# Patient Record
(demographics unavailable — no encounter records)

---

## 2025-04-03 NOTE — HISTORY OF PRESENT ILLNESS
[FreeTextEntry1] : Ms. GRAY LAZCANO is a 79-year-old female with a PMH of CHF, GERD, DM?, COPD.  PSH: sigmoid colectomy 2/2 diverticulitis (2010), hysterectomy.   Patient presents for GERD symptoms, referred by NAPOLEON Hensley from [facility] Patient was seen for progressive dysphagia and heartburn. Subsequently, underwent an UGI series which was notable for "small HH, severe esophageal dysmotility and severe GERD" (report scanned).  Reports she has always had GERD and on a PPI for several years, but symptoms started to worsen over the last few months.  This was around the time she was hospitalized for a CHF exacerbation caused by a respiratory illness in which she was hospitalized at [email or web address] States she had an EGD 5 years ago to assess her GERD symptoms that was normal; completed at [facility] [[email or web address]].   Feels sensation like food gets stuck behind her sternum, occurs daily with solids only.  States her symptoms tend to be worse at nighttime and can take all the way until the morning before she feels the food passes down to her stomach.  Denies difficulty initiating a swallow or pain with swallowing.   Follows with Dr. Roe who is a cardiologist at [facility], seen recently and up to date on cardiac testing to her knowledge. Completed an echocardiogram 2/2025 notable for EF 55%.  Reviewed medication list with patient and she states that she takes mesalamine for a prior diverticulitis episode and has continued for the last 10 years but has never been diagnosed with any form of IBD. She also takes Jardiance but says that she is not sure why because she was never diagnosed with diabetes.    Denies FH of colon cancer or GI malignancies.  Denies FH of IBD or celiac disease.  Former smoker- 1 PPD x 48 years, quit 18 years ago.  Rare ETOH and no recreational drug use.

## 2025-04-03 NOTE — ASSESSMENT
[FreeTextEntry1] : GERD, esophageal dysmotility - Will discuss case with Dr. Valero. Likely will proceed with EGD BRAVO, discussed need for cardiac clearance prior to scheduling. - Will need HRM after EGD. - Patient would like to try alternative PPI. Will switch to esomeprazole 40mg daily and continue famotidine but increase to twice daily.   EGD with [facility] with Dr. Valero Indications: GERD Explained that serious complications of upper endoscopy are rare, but there is a small risk of cardiopulmonary complications (related to procedural sedation), perforation, bleeding, infection. Patient was made aware he/she cannot drive self to hospital and will need an escort home from procedure. NPO guidelines discussed and will be stipulated in detail by coordinator pending the time of their procedure. Holds:  1. Esomeprazole 2 weeks before procedure. 2. Famotidine 24 hours prior to procedure.  3. Furosemide day of procedure. 4. Jardiance 3 days before procedure. 5. Spironolactone day of procedure.

## 2025-07-07 NOTE — REASON FOR VISIT
[Follow-up] : a follow-up of an existing diagnosis [Home] : at home, [unfilled] , at the time of the visit. [Medical Office: ([facility])___] : at the medical office located in  [Telephone (audio)] : This telephonic visit was provided via audio only technology. [Patient preference] : patient preference. [Verbal consent obtained from patient] : the patient, [unfilled]

## 2025-07-07 NOTE — HISTORY OF PRESENT ILLNESS
[FreeTextEntry1] :  Ms. GRAY LAZCANO is a 79 year old female PMH of CHF, GERD, DM?, COPD. PSH: sigmoid colectomy 2/2 diverticulitis (2010), hysterectomy.   Presenting for pre-manometry consult.  Initially presented to care 4/3/25. Evaluated by [place] NP for progressive dysphagia to solids and heartburn. Referred by [facility]. Underwent UGI series, notable for "small HH, severe esophageal dysmotility and severe GERD." Reports she has always had GERD and on a PPI for several years, but symptoms started to worsen over the last few months.  Underwent EGD with Dr. Valero 6/24/25 -large HH at EGJ at 34cm -tortuosity of esophageal lumen - failed attempt to place endoFLIP catheter d/t difficulty advancing catheter.  - Colin ulcers -nonerosive gastritis in stomach.  -normal duodenum  -benign biopsies.

## 2025-07-07 NOTE — ASSESSMENT
[FreeTextEntry1] : Pre-manometry consult: -Dr. Valero requesting Xray esophagram to be completed, which she completed one month ago at [facility]. Fax number provided to fax report. - Reviewed esophageal manometry procedure in detail with patient. Will schedule for 7/16/25 at 1pm with a 12pm arrival. -The risks, benefits and alternatives of the procedure were explained. Risks include nasal irritation, bleeding, coughing, sore throat and sinusitis.  -Patient advised that an escort is not required as no anesthesia is used in this procedure and the procedure will take place over at the [facility] at [address] on the endoscopy unit.  - Can have a normal breakfast no later than 9am the day of your procedure. May consume liquids up to 2 hours before the procedure. - Avoid taking any prokinetic agents, muscle relaxants, opiate pain medications, Valium, Xanax, Ativan,etc. - Patient was made aware that the procedure is performed by an NP/PA and interpreted at a later date by the physician. All questions were answered. The patient expressed understanding of these risks and is agreeable to proceed.